# Patient Record
Sex: FEMALE | Race: WHITE | NOT HISPANIC OR LATINO
[De-identification: names, ages, dates, MRNs, and addresses within clinical notes are randomized per-mention and may not be internally consistent; named-entity substitution may affect disease eponyms.]

---

## 2022-06-02 PROBLEM — Z00.00 ENCOUNTER FOR PREVENTIVE HEALTH EXAMINATION: Status: ACTIVE | Noted: 2022-06-02

## 2022-06-09 ENCOUNTER — APPOINTMENT (OUTPATIENT)
Dept: CARDIOLOGY | Facility: CLINIC | Age: 37
End: 2022-06-09
Payer: COMMERCIAL

## 2022-06-09 VITALS
DIASTOLIC BLOOD PRESSURE: 80 MMHG | SYSTOLIC BLOOD PRESSURE: 140 MMHG | BODY MASS INDEX: 41.46 KG/M2 | HEIGHT: 66 IN | HEART RATE: 73 BPM | WEIGHT: 258 LBS | TEMPERATURE: 97.6 F

## 2022-06-09 DIAGNOSIS — Z78.9 OTHER SPECIFIED HEALTH STATUS: ICD-10-CM

## 2022-06-09 PROCEDURE — 93000 ELECTROCARDIOGRAM COMPLETE: CPT

## 2022-06-09 PROCEDURE — 99204 OFFICE O/P NEW MOD 45 MIN: CPT

## 2022-06-09 RX ORDER — FLUTICASONE PROPIONATE AND SALMETEROL 50; 250 UG/1; UG/1
250-50 POWDER RESPIRATORY (INHALATION)
Refills: 0 | Status: ACTIVE | COMMUNITY

## 2022-06-09 RX ORDER — ALBUTEROL SULFATE 90 UG/1
108 (90 BASE) AEROSOL, METERED RESPIRATORY (INHALATION)
Refills: 0 | Status: ACTIVE | COMMUNITY

## 2022-06-09 RX ORDER — AMLODIPINE BESYLATE 5 MG/1
5 TABLET ORAL DAILY
Refills: 0 | Status: ACTIVE | COMMUNITY

## 2022-06-09 RX ORDER — SERTRALINE HYDROCHLORIDE 25 MG/1
25 TABLET, FILM COATED ORAL DAILY
Refills: 0 | Status: ACTIVE | COMMUNITY

## 2022-06-09 RX ORDER — CLONAZEPAM 0.5 MG/1
0.5 TABLET ORAL
Refills: 0 | Status: ACTIVE | COMMUNITY

## 2022-06-15 ENCOUNTER — APPOINTMENT (OUTPATIENT)
Dept: CARDIOLOGY | Facility: CLINIC | Age: 37
End: 2022-06-15

## 2022-06-15 PROCEDURE — 93306 TTE W/DOPPLER COMPLETE: CPT

## 2022-06-23 ENCOUNTER — APPOINTMENT (OUTPATIENT)
Dept: CARDIOLOGY | Facility: CLINIC | Age: 37
End: 2022-06-23

## 2023-07-03 ENCOUNTER — APPOINTMENT (OUTPATIENT)
Age: 38
End: 2023-07-03
Payer: COMMERCIAL

## 2023-07-03 ENCOUNTER — TRANSCRIPTION ENCOUNTER (OUTPATIENT)
Age: 38
End: 2023-07-03

## 2023-07-03 ENCOUNTER — OUTPATIENT (OUTPATIENT)
Dept: OUTPATIENT SERVICES | Facility: HOSPITAL | Age: 38
LOS: 1 days | End: 2023-07-03
Payer: COMMERCIAL

## 2023-07-03 DIAGNOSIS — Z12.31 ENCOUNTER FOR SCREENING MAMMOGRAM FOR MALIGNANT NEOPLASM OF BREAST: ICD-10-CM

## 2023-07-03 PROCEDURE — 77063 BREAST TOMOSYNTHESIS BI: CPT | Mod: 26

## 2023-07-03 PROCEDURE — 77063 BREAST TOMOSYNTHESIS BI: CPT

## 2023-07-03 PROCEDURE — 77067 SCR MAMMO BI INCL CAD: CPT | Mod: 26

## 2023-07-03 PROCEDURE — 77067 SCR MAMMO BI INCL CAD: CPT

## 2023-07-04 DIAGNOSIS — Z12.31 ENCOUNTER FOR SCREENING MAMMOGRAM FOR MALIGNANT NEOPLASM OF BREAST: ICD-10-CM

## 2023-07-07 ENCOUNTER — LABORATORY RESULT (OUTPATIENT)
Age: 38
End: 2023-07-07

## 2023-07-07 ENCOUNTER — OUTPATIENT (OUTPATIENT)
Dept: OUTPATIENT SERVICES | Facility: HOSPITAL | Age: 38
LOS: 1 days | End: 2023-07-07
Payer: COMMERCIAL

## 2023-07-07 ENCOUNTER — APPOINTMENT (OUTPATIENT)
Dept: ANTEPARTUM | Facility: CLINIC | Age: 38
End: 2023-07-07
Payer: COMMERCIAL

## 2023-07-07 VITALS
DIASTOLIC BLOOD PRESSURE: 97 MMHG | OXYGEN SATURATION: 97 % | WEIGHT: 247 LBS | SYSTOLIC BLOOD PRESSURE: 143 MMHG | HEART RATE: 77 BPM | BODY MASS INDEX: 39.87 KG/M2

## 2023-07-07 DIAGNOSIS — J45.909 UNSPECIFIED ASTHMA, UNCOMPLICATED: ICD-10-CM

## 2023-07-07 DIAGNOSIS — M32.9 SYSTEMIC LUPUS ERYTHEMATOSUS, UNSPECIFIED: ICD-10-CM

## 2023-07-07 DIAGNOSIS — Z31.9 ENCOUNTER FOR PROCREATIVE MANAGEMENT, UNSPECIFIED: ICD-10-CM

## 2023-07-07 DIAGNOSIS — O09.93 SUPERVISION OF HIGH RISK PREGNANCY, UNSPECIFIED, THIRD TRIMESTER: ICD-10-CM

## 2023-07-07 DIAGNOSIS — I10 ESSENTIAL (PRIMARY) HYPERTENSION: ICD-10-CM

## 2023-07-07 DIAGNOSIS — F41.9 ANXIETY DISORDER, UNSPECIFIED: ICD-10-CM

## 2023-07-07 PROCEDURE — 86235 NUCLEAR ANTIGEN ANTIBODY: CPT

## 2023-07-07 PROCEDURE — 85730 THROMBOPLASTIN TIME PARTIAL: CPT

## 2023-07-07 PROCEDURE — 99215 OFFICE O/P EST HI 40 MIN: CPT

## 2023-07-07 PROCEDURE — 84156 ASSAY OF PROTEIN URINE: CPT

## 2023-07-07 PROCEDURE — 82570 ASSAY OF URINE CREATININE: CPT

## 2023-07-07 PROCEDURE — 86147 CARDIOLIPIN ANTIBODY EA IG: CPT

## 2023-07-07 PROCEDURE — 87186 SC STD MICRODIL/AGAR DIL: CPT

## 2023-07-07 PROCEDURE — 86038 ANTINUCLEAR ANTIBODIES: CPT

## 2023-07-07 PROCEDURE — 86162 COMPLEMENT TOTAL (CH50): CPT

## 2023-07-07 PROCEDURE — 87086 URINE CULTURE/COLONY COUNT: CPT

## 2023-07-07 PROCEDURE — 85613 RUSSELL VIPER VENOM DILUTED: CPT

## 2023-07-07 PROCEDURE — 86146 BETA-2 GLYCOPROTEIN ANTIBODY: CPT

## 2023-07-07 PROCEDURE — 84443 ASSAY THYROID STIM HORMONE: CPT

## 2023-07-07 PROCEDURE — 81001 URINALYSIS AUTO W/SCOPE: CPT

## 2023-07-07 PROCEDURE — 87077 CULTURE AEROBIC IDENTIFY: CPT

## 2023-07-07 PROCEDURE — 80053 COMPREHEN METABOLIC PANEL: CPT

## 2023-07-07 PROCEDURE — 84439 ASSAY OF FREE THYROXINE: CPT

## 2023-07-07 PROCEDURE — 86160 COMPLEMENT ANTIGEN: CPT

## 2023-07-07 PROCEDURE — 86225 DNA ANTIBODY NATIVE: CPT

## 2023-07-07 RX ORDER — UBIDECARENONE 200 MG
400 CAPSULE ORAL DAILY
Qty: 90 | Refills: 0 | Status: ACTIVE | COMMUNITY
Start: 2023-07-07 | End: 1900-01-01

## 2023-07-07 RX ORDER — CETIRIZINE HYDROCHLORIDE 10 MG/1
10 TABLET, FILM COATED ORAL
Qty: 30 | Refills: 2 | Status: ACTIVE | COMMUNITY
Start: 2023-07-07 | End: 1900-01-01

## 2023-07-10 LAB
ALBUMIN SERPL ELPH-MCNC: 4.8 G/DL
ALP BLD-CCNC: 79 U/L
ALT SERPL-CCNC: 29 U/L
ANION GAP SERPL CALC-SCNC: 12 MMOL/L
AST SERPL-CCNC: 26 U/L
B2 GLYCOPROT1 AB SER QL: NEGATIVE
BILIRUB SERPL-MCNC: 1.3 MG/DL
BUN SERPL-MCNC: 7 MG/DL
C3 SERPL-MCNC: 146 MG/DL
C4 SERPL-MCNC: 23 MG/DL
CALCIUM SERPL-MCNC: 9.5 MG/DL
CH50 SERPL-MCNC: 94 U/ML
CHLORIDE SERPL-SCNC: 99 MMOL/L
CO2 SERPL-SCNC: 26 MMOL/L
CONFIRM: NORMAL
CREAT SERPL-MCNC: 0.7 MG/DL
CREAT SPEC-SCNC: 34 MG/DL
CREAT/PROT UR: 0.3 RATIO
DRVVT IMM 1:2 NP PPP: NORMAL
DRVVT SCREEN TO CONFIRM RATIO: 0.96 RATIO
DSDNA AB SER-ACNC: <12 IU/ML
EGFR: 112 ML/MIN/1.73M2
ENA SS-A AB SER IA-ACNC: <0.2 AL
ENA SS-B AB SER IA-ACNC: <0.2 AL
GLUCOSE SERPL-MCNC: 82 MG/DL
POTASSIUM SERPL-SCNC: 3.8 MMOL/L
PROT SERPL-MCNC: 7.3 G/DL
PROT UR-MCNC: 10 MG/DLG/24H
SCREEN DRVVT: NORMAL
SILICA CLOTTING TIME INTERPRETATION: NORMAL
SILICA CLOTTING TIME S/C: 1.05 RATIO
SODIUM SERPL-SCNC: 137 MMOL/L
T4 FREE SERPL-MCNC: 1 NG/DL
TSH SERPL-ACNC: 2.44 UIU/ML

## 2023-07-16 LAB
ANA SER IF-ACNC: NEGATIVE
BACTERIA UR CULT: ABNORMAL
CARDIOLIPIN AB SER IA-ACNC: NEGATIVE

## 2023-08-03 ENCOUNTER — TRANSCRIPTION ENCOUNTER (OUTPATIENT)
Age: 38
End: 2023-08-03

## 2023-08-09 ENCOUNTER — OUTPATIENT (OUTPATIENT)
Dept: OUTPATIENT SERVICES | Facility: HOSPITAL | Age: 38
LOS: 1 days | End: 2023-08-09
Payer: COMMERCIAL

## 2023-08-09 ENCOUNTER — APPOINTMENT (OUTPATIENT)
Dept: ANTEPARTUM | Facility: CLINIC | Age: 38
End: 2023-08-09
Payer: COMMERCIAL

## 2023-08-09 DIAGNOSIS — O09.90 SUPERVISION OF HIGH RISK PREGNANCY, UNSPECIFIED, UNSPECIFIED TRIMESTER: ICD-10-CM

## 2023-08-09 PROCEDURE — 99213 OFFICE O/P EST LOW 20 MIN: CPT

## 2023-08-09 PROCEDURE — 99443: CPT

## 2023-08-09 NOTE — DISCUSSION/SUMMARY
[FreeTextEntry1] : 23 MFM Att'g and PGY-3 Preconception Consult Note: Ms Maciel is referred by Dr Krishnan.  She is a 38y G0 LMP  presenting for pre-pregnancy consultation for cHTN, asthma, anxiety, and a positive LAURA 1:160 detected during fertility evaluation. Currently receiving reproductive care from Dr. Krishnan in [place], [place]. Patient desires fertility and is undergoing IUI with donor sperm.  PMHx:  Chr Htn  , on amlodipine bp normal on home monitoring (sbp 116-120), mildly elevated here.  Normal ECHO in .               Asthma since childhood, no hosp or intub.  Currently requiring rescue inhaler from 1-10x/wk. Not adherent to meds.              : LAURA + 1:160.  No other Hx or SSx of SLE, does not meet diagnostic criteria.  Lupus anticoagulant neg.              : Pos SHANNA 4G/5G; MTHFR homozygote.             Anxiety Disorder: Clonazepam prn, taking less than once weekly.  At times she is unable to differentiate asthma from anxiety.            Depression Hx, previously on sertaline, dc'd one year ago.             No personal/FHx of VTE; No DM.  Surg:  Dx Lapscope with [facility]ER for endometriosis.             Hip surgery Meds: Amlodipine 5mg/d since .             Advair: Takes only once daily.           Albuterol: REcently several times daily. Last taken last night.            Sertraline for depression anxiety was helpful.  Discontinued 1y ago.  Allergies: NKDA OB Hx: G0 Gyn Hx: denies abnormal pap smears, fibroids, cysts, stds.  Never has had sexual relations.  h/o diagnostic laparoscopy, stage I endometriosis excised with hysteroscopy and lysis of uterine adhesions.  FH: sister with T2DM dx after pregnancy, mother with h/o ovarian cancer at age 46 SH: Currently employed, she lives alone in an apartment she owns with her sister living the floor above her. Has dogs, cats, and guinea pigs. Denies T/A/D use.  States she has not had sexual relations. Psych: h/o anxiety and depression Genetics: denies h/o genetic abnormalities  Px: Pleasant woman, NAD. Fair historian. VS: 143/97, HR: 77 bpm, O2sat: 97 %, Wt: 247 lbs  Heart: RRR, no M/R/G Lungs: CTAB Abd: soft, nontender, nondistended LE: no erythema, edema or pain  Labs:  Aneg/AntibNeg;  HbEP AA.              RPR/HIV/HBSAg NR see scanned records for thrombophilia w/u: all neg except: MTHFR homozygous;  SHANNA 4G/5G single copy detected.  A/P Patient is a 37yo virginal G0 LMP  presenting for pre-pregnancy consultation for cHTN, asthma, anxiety, and positive LAURA. Currently receiving reproductive care from Dr. Krishnan. 1. cHTN adequate control per home bp monitoring.  -->Continue with 5mg amlodipine daily. Do not stop if +pregnancy test.  -->Advised to continue BP home monitoring and start a log  2. Asthma: suboptimal control likely 2o to suboptimal adherence to prophylactic Rx.  Goal is to reduce use of albuterol to once a week -->Advised to take advair twice daily every day, as prescribed.  -->Advised to take claritin/zyrtec daily and to consider adding singulair if adherence to Advair does not control exacerbations.   3. Positive LAURA: most recent labs were in . - Ordered f/u TSH, fT4, CMP, UPrCr, SSA/SSB and dsDNA  4. MTHFR homozygous  -->methylfolate supplementation  5. Anxiety Disorder: Pt states sertraline Rx has helped her in the past but that she was advised to stop.  Sertraline has a long safety record in pregnancy, and at usual doses has extremely low risk of  abstinence syndrome.  If pt feels better on Sertraline and requires less benzodiazenes for acute anxiety, we would consider restarting sertraline.   RTC 1 mos for results and follow up  Thank you for allowing us to be part of Ms Maciel's pregnancy care team.  Please do not hesitate to call me if you have further questions or concerns.  MD Mouna, FACOG with BRIGHT Bob MD, PGY-3 cell [phone]

## 2023-08-09 NOTE — END OF VISIT
[Time Spent: ___ minutes] : I have spent [unfilled] minutes of time on the encounter. [FreeTextEntry3] : I personally interviewed and counseled Ms Maciel today.

## 2023-08-09 NOTE — DISCUSSION/SUMMARY
[FreeTextEntry1] : Mitra Maciel  [birth year] 8/9/23 MFM Att'g and PGY-3 Preconception Consult Note: Ms Maciel returns for telephone visit following referral by Dr Krishnan for preconception counseling.  She is a 38y G0 with cHTN, asthma, anxiety, recent UTI and a positive LAURA 1:160 detected during fertility evaluation. Currently receiving reproductive care from Dr. Krishnan in [place], [place]. Patient desires fertility and is undergoing IUI with donor sperm.  She underwent egg retrieval last week which she tolerated well.   PMHx:  Chr Htn  , on amlodipine bp normal on home monitoring (sbp 116-120), mildly elevated here.  Normal ECHO in .               Asthma since childhood, no hosp or intub.  Currently requiring rescue inhaler from 1-10x/wk. Not adherent to meds.              : LAURA + 1:160.  No other Hx or SSx of SLE, does not meet diagnostic criteria.  Lupus anticoagulant neg.              : Pos SHANNA 4G/5G; MTHFR homozygote.             Anxiety Disorder: Clonazepam prn, taking less than once weekly.  At times she is unable to differentiate asthma from anxiety.            Depression Hx, previously on sertaline, dc'd one year ago.             No personal/FHx of VTE; No DM.  Surg:  Dx Lapscope with [facility]ER for endometriosis.             Hip surgery Meds:	Amlodipine 5mg/d since .             	Advair: Takes only once daily.           	Albuterol: REcently several times daily. Last taken last night.            	Sertraline for depression anxiety was helpful.  Discontinued 1y ago.  Allergies: NKDA OB Hx: G0 Gyn Hx: denies abnormal pap smears, fibroids, cysts, stds.  Never has had sexual relations.  h/o diagnostic laparoscopy, stage I endometriosis excised with hysteroscopy and lysis of uterine adhesions.  FH: sister with T2DM dx after pregnancy, mother with h/o ovarian cancer at age 46 SH: Currently employed, she lives alone in an apartment she owns with her sister living the floor above her. Has dogs, cats, and guinea pigs. Denies T/A/D use.  States she has not had sexual relations. Psych: h/o anxiety and depression Genetics: denies h/o genetic abnormalities  Px from 23: Pleasant woman, NAD. Fair historian. VS: 143/97, HR: 77 bpm, O2sat: 97 %, Wt: 247 lbs  Heart: RRR, no M/R/G Lungs: CTAB Abd: soft, nontender, nondistended LE: no erythema, edema or pain  Labs:  Aneg/AntibNeg;  HbEP AA.              RPR/HIV/HBSAg NR see scanned records for thrombophilia w/u: all neg except: MTHFR homozygous;  SHANNA 4G/5G single copy detected. 23: Lupus Anticoagulant Neg; TFTs normal; Total completment,C3,C4 all normal.                LAURA < 1:80; DS DNA Ab: <12 (normal);   Anticardiolipin Ab total negative.  Beta-2 glycoprotein Negative.            	UPr/Cr = 10/34 = 0.29 (borderline, likely 2o to UTI.   	Urinalysis: 1.008/6.5/LE trace, dip neg.   UCx:  K pneumoniae >100k: Amp Res; Sens to nitrofurantoin & TMPs           P mirabilil >100k: Nitrofurantoin Res; Sens to amp and TMPs.    IMPRESSION/RECOMMENDATIONS: Ms Maciel is a 37yo G0 referred for pre-pregnancy consultation for cHTN, asthma, anxiety, and positive LAURA. Currently receiving reproductive care from Dr. Krishnan and has tolerated her first egg retrieval well.  At this time we see no contraindication to pursuing pregnancy according to her wishes.  In detail:  1. cHTN adequate control per home bp monitoring.  -->Continue with 5mg amlodipine daily. Do NOT stop if +pregnancy test.  -->Advised to continue BP home monitoring and start a log  2. Asthma: suboptimal control likely 2o to suboptimal adherence to prophylactic Rx.  Goal is to reduce use of albuterol to once a week.  We discussed asthma in pregnancy.  Exacerbations can increase, decrease, or stay the same.  We consider optimization of Rx and stabilization of maternal airways to be an important part of pregnancy care.  -->Advised to take advair twice daily every day, as prescribed.  -->Advised to take claritin/zyrtec daily and to consider adding singulair if adherence to Advair does not control exacerbations.   3. Positive LAURA: most recent labs were in . - TSH, fT4, CMP, UPrCr, SSA/SSB, LAC, [id number], complement, LAURA and dsDNA: ALL NORMAL Pt does not appear to have lupus or other connective tissue disorder that could put her at increased risk of pregnancy loss.   4. MTHFR homozygous  -->methylfolate supplementation  5. Anxiety Disorder: Pt states sertraline Rx has helped her in the past but that she was advised to stop.  Sertraline has a long safety record in pregnancy, and at usual doses has extremely low risk of  abstinence syndrome.  If pt feels better on Sertraline and requires less benzodiazenes for acute anxiety, we would advocate restarting sertraline.   6. Increased risk of preeclampsia: Both maternal age > 35 and chronic hypertension place Ms Maciel at significantly increased risk of preeclampsia and  preeclampsia.  We recommend close monitoring throughout pregnancy, especially in the 3rd trimester, including monthly growth US and twice weekly fetal testing starting at 32w EGA.    We will be happy to see Ms Maciel back for f/u at any time.   Thank you for allowing us to be part of Ms Maciel's pregnancy care team.  Please do not hesitate to call me if you have further questions or concerns.  MD Mouna, FACOG  cell [phone]

## 2023-08-09 NOTE — DISCUSSION/SUMMARY
[FreeTextEntry1] : 23 MFM Att'g and PGY-3 Preconception Consult Note: Ms Maciel is referred by Dr Krishnan.  She is a 38y G0 LMP  presenting for pre-pregnancy consultation for cHTN, asthma, anxiety, and a positive LAURA 1:160 detected during fertility evaluation. Currently receiving reproductive care from Dr. Krishnan in [place], [place]. Patient desires fertility and is undergoing IUI with donor sperm.  PMHx:  Chr Htn  , on amlodipine bp normal on home monitoring (sbp 116-120), mildly elevated here.  Normal ECHO in .               Asthma since childhood, no hosp or intub.  Currently requiring rescue inhaler from 1-10x/wk. Not adherent to meds.              : LAURA + 1:160.  No other Hx or SSx of SLE, does not meet diagnostic criteria.  Lupus anticoagulant neg.              : Pos SHANNA 4G/5G; MTHFR homozygote.             Anxiety Disorder: Clonazepam prn, taking less than once weekly.  At times she is unable to differentiate asthma from anxiety.            Depression Hx, previously on sertaline, dc'd one year ago.             No personal/FHx of VTE; No DM.  Surg:  Dx Lapscope with FER for endometriosis.             Hip surgery Meds: Amlodipine 5mg/d since .             Advair: Takes only once daily.           Albuterol: REcently several times daily. Last taken last night.            Sertraline for depression anxiety was helpful.  Discontinued 1y ago.  Allergies: NKDA OB Hx: G0 Gyn Hx: denies abnormal pap smears, fibroids, cysts, stds.  Never has had sexual relations.  h/o diagnostic laparoscopy, stage I endometriosis excised with hysteroscopy and lysis of uterine adhesions.  FH: sister with T2DM dx after pregnancy, mother with h/o ovarian cancer at age 46 SH: Currently employed, she lives alone in an apartment she owns with her sister living the floor above her. Has dogs, cats, and guinea pigs. Denies T/A/D use.  States she has not had sexual relations. Psych: h/o anxiety and depression Genetics: denies h/o genetic abnormalities  Px: Pleasant woman, NAD. Fair historian. VS: 143/97, HR: 77 bpm, O2sat: 97 %, Wt: 247 lbs  Heart: RRR, no M/R/G Lungs: CTAB Abd: soft, nontender, nondistended LE: no erythema, edema or pain  Labs:  Aneg/AntibNeg;  HbEP AA.              RPR/HIV/HBSAg NR see scanned records for thrombophilia w/u: all neg except: MTHFR homozygous;  SHANNA 4G/5G single copy detected.  A/P Patient is a 39yo virginal G0 LMP  presenting for pre-pregnancy consultation for cHTN, asthma, anxiety, and positive LAURA. Currently receiving reproductive care from Dr. Krishnan. 1. cHTN adequate control per home bp monitoring.  -->Continue with 5mg amlodipine daily. Do not stop if +pregnancy test.  -->Advised to continue BP home monitoring and start a log  2. Asthma: suboptimal control likely 2o to suboptimal adherence to prophylactic Rx.  Goal is to reduce use of albuterol to once a week -->Advised to take advair twice daily every day, as prescribed.  -->Advised to take claritin/zyrtec daily and to consider adding singulair if adherence to Advair does not control exacerbations.   3. Positive LAURA: most recent labs were in . - Ordered f/u TSH, fT4, CMP, UPrCr, SSA/SSB and dsDNA  4. MTHFR homozygous  -->methylfolate supplementation  5. Anxiety Disorder: Pt states sertraline Rx has helped her in the past but that she was advised to stop.  Sertraline has a long safety record in pregnancy, and at usual doses has extremely low risk of  abstinence syndrome.  If pt feels better on Sertraline and requires less benzodiazenes for acute anxiety, we would consider restarting sertraline.   RTC 1 mos for results and follow up  Thank you for allowing us to be part of Ms Maciel's pregnancy care team.  Please do not hesitate to call me if you have further questions or concerns.  MD Mouna, FACOG with BRIGHT Bob MD, PGY-3 cell [phone]

## 2023-08-11 DIAGNOSIS — I10 ESSENTIAL (PRIMARY) HYPERTENSION: ICD-10-CM

## 2023-08-11 DIAGNOSIS — Z31.69 ENCOUNTER FOR OTHER GENERAL COUNSELING AND ADVICE ON PROCREATION: ICD-10-CM

## 2023-08-11 DIAGNOSIS — F41.9 ANXIETY DISORDER, UNSPECIFIED: ICD-10-CM

## 2023-08-11 DIAGNOSIS — J45.20 MILD INTERMITTENT ASTHMA, UNCOMPLICATED: ICD-10-CM

## 2025-02-03 ENCOUNTER — NON-APPOINTMENT (OUTPATIENT)
Age: 40
End: 2025-02-03

## 2025-07-11 ENCOUNTER — NON-APPOINTMENT (OUTPATIENT)
Age: 40
End: 2025-07-11

## 2025-07-23 ENCOUNTER — APPOINTMENT (OUTPATIENT)
Dept: OBGYN | Facility: CLINIC | Age: 40
End: 2025-07-23
Payer: COMMERCIAL

## 2025-07-23 VITALS
BODY MASS INDEX: 21.86 KG/M2 | HEIGHT: 66 IN | SYSTOLIC BLOOD PRESSURE: 146 MMHG | DIASTOLIC BLOOD PRESSURE: 80 MMHG | WEIGHT: 136 LBS

## 2025-07-23 DIAGNOSIS — Z87.09 PERSONAL HISTORY OF OTHER DISEASES OF THE RESPIRATORY SYSTEM: ICD-10-CM

## 2025-07-23 DIAGNOSIS — Z01.419 ENCOUNTER FOR GYNECOLOGICAL EXAMINATION (GENERAL) (ROUTINE) W/OUT ABNORMAL FINDINGS: ICD-10-CM

## 2025-07-23 DIAGNOSIS — Z86.59 PERSONAL HISTORY OF OTHER MENTAL AND BEHAVIORAL DISORDERS: ICD-10-CM

## 2025-07-23 DIAGNOSIS — Z86.79 PERSONAL HISTORY OF OTHER DISEASES OF THE CIRCULATORY SYSTEM: ICD-10-CM

## 2025-07-23 DIAGNOSIS — Z80.41 FAMILY HISTORY OF MALIGNANT NEOPLASM OF OVARY: ICD-10-CM

## 2025-07-23 PROCEDURE — 99386 PREV VISIT NEW AGE 40-64: CPT | Mod: 25

## 2025-07-23 PROCEDURE — 99459 PELVIC EXAMINATION: CPT | Mod: NC

## 2025-07-23 RX ORDER — FERROUS SULFATE 325(65) MG
325 (65 FE) TABLET ORAL
Refills: 0 | Status: ACTIVE | COMMUNITY

## 2025-07-23 RX ORDER — UBIDECARENONE/VIT E ACET 100MG-5
CAPSULE ORAL
Refills: 0 | Status: ACTIVE | COMMUNITY

## 2025-07-30 ENCOUNTER — NON-APPOINTMENT (OUTPATIENT)
Age: 40
End: 2025-07-30

## 2025-08-14 ENCOUNTER — APPOINTMENT (OUTPATIENT)
Dept: OBGYN | Facility: CLINIC | Age: 40
End: 2025-08-14
Payer: COMMERCIAL

## 2025-08-14 VITALS
BODY MASS INDEX: 21.86 KG/M2 | HEIGHT: 66 IN | SYSTOLIC BLOOD PRESSURE: 128 MMHG | WEIGHT: 136 LBS | DIASTOLIC BLOOD PRESSURE: 82 MMHG

## 2025-08-14 VITALS — WEIGHT: 236 LBS | BODY MASS INDEX: 38.09 KG/M2

## 2025-08-14 DIAGNOSIS — Z13.79 ENCOUNTER FOR OTHER SCREENING FOR GENETIC AND CHROMOSOMAL ANOMALIES: ICD-10-CM

## 2025-08-14 DIAGNOSIS — Z80.41 FAMILY HISTORY OF MALIGNANT NEOPLASM OF OVARY: ICD-10-CM

## 2025-08-14 PROCEDURE — 99213 OFFICE O/P EST LOW 20 MIN: CPT

## 2025-08-14 PROCEDURE — 36415 COLL VENOUS BLD VENIPUNCTURE: CPT
